# Patient Record
Sex: FEMALE | Race: ASIAN | NOT HISPANIC OR LATINO
[De-identification: names, ages, dates, MRNs, and addresses within clinical notes are randomized per-mention and may not be internally consistent; named-entity substitution may affect disease eponyms.]

---

## 2023-12-05 ENCOUNTER — RESULT REVIEW (OUTPATIENT)
Age: 28
End: 2023-12-05

## 2023-12-05 ENCOUNTER — APPOINTMENT (OUTPATIENT)
Dept: SURGICAL ONCOLOGY | Facility: CLINIC | Age: 28
End: 2023-12-05
Payer: COMMERCIAL

## 2023-12-05 PROCEDURE — 99205 OFFICE O/P NEW HI 60 MIN: CPT | Mod: 25

## 2023-12-05 PROCEDURE — 10021 FNA BX W/O IMG GDN 1ST LES: CPT | Mod: RT

## 2023-12-06 PROBLEM — Z00.00 ENCOUNTER FOR PREVENTIVE HEALTH EXAMINATION: Status: ACTIVE | Noted: 2023-12-06

## 2025-02-01 ENCOUNTER — TRANSCRIPTION ENCOUNTER (OUTPATIENT)
Age: 30
End: 2025-02-01

## 2025-02-01 ENCOUNTER — EMERGENCY (EMERGENCY)
Facility: HOSPITAL | Age: 30
LOS: 1 days | Discharge: HOME | End: 2025-02-01
Admitting: EMERGENCY MEDICINE
Payer: COMMERCIAL

## 2025-02-01 VITALS
HEIGHT: 63 IN | HEART RATE: 103 BPM | SYSTOLIC BLOOD PRESSURE: 139 MMHG | TEMPERATURE: 98 F | WEIGHT: 195.11 LBS | OXYGEN SATURATION: 100 % | RESPIRATION RATE: 16 BRPM | DIASTOLIC BLOOD PRESSURE: 83 MMHG

## 2025-02-01 VITALS
TEMPERATURE: 98 F | RESPIRATION RATE: 17 BRPM | OXYGEN SATURATION: 100 % | HEART RATE: 84 BPM | DIASTOLIC BLOOD PRESSURE: 63 MMHG | SYSTOLIC BLOOD PRESSURE: 119 MMHG

## 2025-02-01 LAB
ALBUMIN SERPL ELPH-MCNC: 4.2 G/DL — SIGNIFICANT CHANGE UP (ref 3.3–5)
ALP SERPL-CCNC: 48 U/L — SIGNIFICANT CHANGE UP (ref 40–120)
ALT FLD-CCNC: 18 U/L — SIGNIFICANT CHANGE UP (ref 4–33)
ANION GAP SERPL CALC-SCNC: 12 MMOL/L — SIGNIFICANT CHANGE UP (ref 7–14)
AST SERPL-CCNC: 14 U/L — SIGNIFICANT CHANGE UP (ref 4–32)
BASOPHILS # BLD AUTO: 0.04 K/UL — SIGNIFICANT CHANGE UP (ref 0–0.2)
BASOPHILS NFR BLD AUTO: 0.7 % — SIGNIFICANT CHANGE UP (ref 0–2)
BILIRUB SERPL-MCNC: 0.3 MG/DL — SIGNIFICANT CHANGE UP (ref 0.2–1.2)
BUN SERPL-MCNC: 7 MG/DL — SIGNIFICANT CHANGE UP (ref 7–23)
CALCIUM SERPL-MCNC: 9.4 MG/DL — SIGNIFICANT CHANGE UP (ref 8.4–10.5)
CHLORIDE SERPL-SCNC: 104 MMOL/L — SIGNIFICANT CHANGE UP (ref 98–107)
CO2 SERPL-SCNC: 23 MMOL/L — SIGNIFICANT CHANGE UP (ref 22–31)
CREAT SERPL-MCNC: 0.79 MG/DL — SIGNIFICANT CHANGE UP (ref 0.5–1.3)
EGFR: 104 ML/MIN/1.73M2 — SIGNIFICANT CHANGE UP
EOSINOPHIL # BLD AUTO: 0.12 K/UL — SIGNIFICANT CHANGE UP (ref 0–0.5)
EOSINOPHIL NFR BLD AUTO: 2 % — SIGNIFICANT CHANGE UP (ref 0–6)
GLUCOSE SERPL-MCNC: 86 MG/DL — SIGNIFICANT CHANGE UP (ref 70–99)
HCT VFR BLD CALC: 40.9 % — SIGNIFICANT CHANGE UP (ref 34.5–45)
HGB BLD-MCNC: 13 G/DL — SIGNIFICANT CHANGE UP (ref 11.5–15.5)
IANC: 3.87 K/UL — SIGNIFICANT CHANGE UP (ref 1.8–7.4)
IMM GRANULOCYTES NFR BLD AUTO: 0.2 % — SIGNIFICANT CHANGE UP (ref 0–0.9)
LYMPHOCYTES # BLD AUTO: 1.3 K/UL — SIGNIFICANT CHANGE UP (ref 1–3.3)
LYMPHOCYTES # BLD AUTO: 21.2 % — SIGNIFICANT CHANGE UP (ref 13–44)
MAGNESIUM SERPL-MCNC: 2.3 MG/DL — SIGNIFICANT CHANGE UP (ref 1.6–2.6)
MCHC RBC-ENTMCNC: 25.9 PG — LOW (ref 27–34)
MCHC RBC-ENTMCNC: 31.8 G/DL — LOW (ref 32–36)
MCV RBC AUTO: 81.5 FL — SIGNIFICANT CHANGE UP (ref 80–100)
MONOCYTES # BLD AUTO: 0.78 K/UL — SIGNIFICANT CHANGE UP (ref 0–0.9)
MONOCYTES NFR BLD AUTO: 12.7 % — SIGNIFICANT CHANGE UP (ref 2–14)
NEUTROPHILS # BLD AUTO: 3.87 K/UL — SIGNIFICANT CHANGE UP (ref 1.8–7.4)
NEUTROPHILS NFR BLD AUTO: 63.2 % — SIGNIFICANT CHANGE UP (ref 43–77)
NRBC # BLD AUTO: 0 K/UL — SIGNIFICANT CHANGE UP (ref 0–0)
NRBC # BLD: 0 /100 WBCS — SIGNIFICANT CHANGE UP (ref 0–0)
NRBC # FLD: 0 K/UL — SIGNIFICANT CHANGE UP (ref 0–0)
NRBC BLD-RTO: 0 /100 WBCS — SIGNIFICANT CHANGE UP (ref 0–0)
PLATELET # BLD AUTO: 321 K/UL — SIGNIFICANT CHANGE UP (ref 150–400)
POTASSIUM SERPL-MCNC: 3.9 MMOL/L — SIGNIFICANT CHANGE UP (ref 3.5–5.3)
POTASSIUM SERPL-SCNC: 3.9 MMOL/L — SIGNIFICANT CHANGE UP (ref 3.5–5.3)
PROT SERPL-MCNC: 7.8 G/DL — SIGNIFICANT CHANGE UP (ref 6–8.3)
RBC # BLD: 5.02 M/UL — SIGNIFICANT CHANGE UP (ref 3.8–5.2)
RBC # FLD: 14.3 % — SIGNIFICANT CHANGE UP (ref 10.3–14.5)
SODIUM SERPL-SCNC: 139 MMOL/L — SIGNIFICANT CHANGE UP (ref 135–145)
WBC # BLD: 6.12 K/UL — SIGNIFICANT CHANGE UP (ref 3.8–10.5)
WBC # FLD AUTO: 6.12 K/UL — SIGNIFICANT CHANGE UP (ref 3.8–10.5)

## 2025-02-01 PROCEDURE — 99284 EMERGENCY DEPT VISIT MOD MDM: CPT

## 2025-02-01 NOTE — ED PROVIDER NOTE - PATIENT PORTAL LINK FT
You can access the FollowMyHealth Patient Portal offered by [facility] by registering at the following website: [email or web address]. By joining [employer]’s FollowMyHealth portal, you will also be able to view your health information using other applications (apps) compatible with our system.

## 2025-02-01 NOTE — ED ADULT TRIAGE NOTE - CHIEF COMPLAINT QUOTE
Pt c/o multiple episodes of diarrhea, reports recently had covid two weeks ago. Endorsing diffuse lower abd pain. Denies n/v, urinary symptoms, fever, sob.

## 2025-02-01 NOTE — ED ADULT NURSE NOTE - OBJECTIVE STATEMENT
Patient received to wellness, a&ox4, ambulatory. c/o diarrhea, dysuria, and abdominal pain x 2weeks after COVID dx. pt denies chest pain, sob, n+v, headache, dizziness. RR even, unlabored. Pending SHAHNAZ israel.

## 2025-02-01 NOTE — ED PROVIDER NOTE - CLINICAL SUMMARY MEDICAL DECISION MAKING FREE TEXT BOX
Patient is a 29-year-old female with no pertinent past medical history presents with diarrhea x 2 weeks.  Patient reports 2 weeks ago she had nasal congestion and tested positive for COVID–19.  Patient reports she then developed "muddy" diarrhea.  Patient reports she has had diarrhea daily for the past 2 weeks, now becoming watery.  Patient reports 3-4 episodes of nonbloody diarrhea per day.  Patient also reports intermittent generalized lower abdominal cramping when she has urge to have a bowel movement.  Patient denies any fevers, chills, chest pain, shortness of breath, cough, nausea, vomiting, flank pain, back pain, dysuria, cloudy urine, vaginal discharge, recent antibiotic use, recent hospitalizations, recent travel history.  This is a patient with 2 weeks of watery nonbloody diarrhea.  Very low clinical suspicion for disease processes including but not limited to diverticulitis, colitis, appendicitis.  Plan to order labs, stool studies.  Patient reports she has appointment to see a gastroenterologist in 2 days.  Disposition pending workup.

## 2025-02-01 NOTE — ED PROVIDER NOTE - NSFOLLOWUPINSTRUCTIONS_ED_ALL_ED_FT
Advance activity as tolerated.  Continue all previously prescribed medications as directed unless otherwise instructed.      Follow up with your primary care physician in 48-72 hours- bring copies of your results.  Return to the Emergency Department for worsening or persistent symptoms, including but not limited to worsening/persistent pain, fevers, vomiting, chest pain, black or bloody stools, fevers, passing out, lightheadedness, dizziness, inability to pass bowel movements or gas, abdominal distension/swelling, and/or ANY NEW OR CONCERNING SYMPTOMS. If you have issues obtaining follow up, please call: [phone] to obtain a doctor or specialist who takes your insurance in your area.  You may call [phone] to make an appointment with the internal medicine clinic.    ABDOMINAL PAIN - General Information    Abdominal Pain    WHAT YOU NEED TO KNOW:    What do I need to know about abdominal pain? Abdominal pain may be felt anywhere between the bottom of your rib cage and your groin. Acute pain usually lasts less than 3 months. Chronic pain lasts longer than 3 months. Your pain may be sharp or dull. The pain may stay in the same place or move around. You may have the pain all the time, or it may come and go. Depending on the cause, you may also have nausea, vomiting, fever, or diarrhea.  Abdominal Organs    What causes abdominal pain? The cause may not be found. The following are common causes:    Overeating, gas pains, or food poisoning    Constipation or diarrhea    An injury    Appendicitis, a hernia, or an ulcer    Infection or a blockage    A liver, gallbladder, or kidney condition  How is the cause of abdominal pain diagnosed? Your healthcare provider will check your abdomen. He or she will ask where your pain is and when it started. Tell him or her if the pain wakes you or stops you from doing your daily activities. Describe anything that makes the pain better or worse. You may also need any of the following:    Blood, urine, or bowel movement samples may be tested for signs of an infection, disease, or injury.    X-ray pictures of your abdomen may show an injury or other cause of the pain.  How is abdominal pain treated?    Prescription pain medicine may be given. Ask your healthcare provider how to take this medicine safely. Some prescription pain medicines contain acetaminophen. Do not take other medicines that contain acetaminophen without talking to your healthcare provider. Too much acetaminophen may cause liver damage. Prescription pain medicine may cause constipation. Ask your healthcare provider how to prevent or treat constipation.    Medicines may be given to calm your stomach or prevent vomiting.    Relaxation therapy may be used along with pain medicine.    Surgery may be needed, depending on the cause.  What can I do to manage or prevent abdominal pain?    Apply heat on your abdomen for 20 to 30 minutes every 2 hours for as many days as directed. Heat helps decrease pain and muscle spasms.    Make changes to the foods you eat, if needed. Do not eat foods that cause abdominal pain or other symptoms. Eat small meals more often. The following changes may also help:  Eat more high-fiber foods if you are constipated. High-fiber foods include fruits, vegetables, whole-grain foods, and legumes such as mcnair beans.        Do not eat foods that cause gas if you have bloating. Examples include broccoli, cabbage, beans, and carbonated drinks.    Do not eat foods or drinks that contain sorbitol or fructose if you have diarrhea and bloating. Some examples are fruit juices, candy, jelly, and sugar-free gum.    Do not eat high-fat foods. Examples include fried foods, cheeseburgers, hot dogs, and desserts.    Make changes to the liquids you drink, if needed. Do not drink liquids that cause pain or make it worse, such as orange juice. Drink liquids throughout the day to stay hydrated. The following changes may also help:  Drink more liquids to prevent dehydration from diarrhea or vomiting. Ask your healthcare provider how much liquid to drink each day and which liquids are best for you.    Limit or do not have caffeine. Caffeine may make symptoms such as heartburn or nausea worse.    Limit or do not drink alcohol. Alcohol can make your abdominal pain worse. Ask your healthcare provider if it is okay for you to drink alcohol. Also ask how much is okay for you to drink. A drink of alcohol is 12 ounces of beer, ½ ounce of liquor, or 5 ounces of wine.    Keep a diary of your abdominal pain. A diary may help your healthcare provider learn what is causing your pain. Include when the pain happens, how long it lasts, and what the pain feels like. Write down any other symptoms you have with abdominal pain. Also write down what you eat, and any symptoms you have after you eat.    Manage stress. Stress may cause abdominal pain. Your healthcare provider may recommend relaxation techniques and deep breathing exercises to help decrease your stress. Your healthcare provider may recommend you talk to someone about your stress or anxiety, such as a counselor or a friend. Get plenty of sleep. Exercise regularly.  [employer] Walking for Exercise      Do not smoke. Nicotine and other chemicals in cigarettes can damage your esophagus and stomach. Ask your healthcare provider for information if you currently smoke and need help to quit. E-cigarettes or smokeless tobacco still contain nicotine. Talk to your healthcare provider before you use these products.  Call your local emergency number (911 in the [place]) if:    You have chest pain or shortness of breath.    When should I seek immediate care?    You have pulsing pain in your upper abdomen or lower back that suddenly becomes constant.    Your pain is in the right lower abdominal area and worsens with movement.    You have a fever over 100.4°F (38°C) or shaking chills.    You are vomiting and cannot keep food or liquids down.    Your pain does not improve or gets worse over the next 8 to 12 hours.    You see blood in your vomit or bowel movements, or they look black and tarry.    Your skin or the whites of your eyes turn yellow.    You are a woman and have a large amount of vaginal bleeding that is not your monthly period.  When should I call my doctor?    You have pain in your lower back.    You are a man and have pain in your testicles.    You have pain when you urinate.    You have questions or concerns about your condition or care.  CARE AGREEMENT:    You have the right to help plan your care. Learn about your health condition and how it may be treated. Discuss treatment options with your healthcare providers to decide what care you want to receive. You always have the right to refuse treatment.    © [employer] 1973, 2023    ACUTE DIARRHEA - General Information    Acute Diarrhea    WHAT YOU NEED TO KNOW:    What do I need to know about acute diarrhea?Acute diarrhea starts quickly and lasts a short time, usually 1 to 3 days. It can last up to 2 weeks.    What causes acute diarrhea?    Bacteria, such as E coli or salmonella    Viruses, such as rotavirus and norovirus    A parasite, such as giardia    Medicines, such as laxatives, antacids, or antibiotics    An allergy to lactose, soy, or gluten    Eating food or drinking water that contains germs    Medical treatments, such as chemotherapy or radiation  What other signs and symptoms might I have with acute diarrhea? You may have 3 or more episodes of diarrhea. It may be hard to control your diarrhea. You may also have any of the following:    Fever and chills    Headache or abdominal pain    Nausea and vomiting    Symptoms of dehydration such as thirst, decreased urination, dry skin, sunken eyes, or fast, pounding heartbeat  What does my healthcare provider need to know about my acute diarrhea? Your healthcare provider will ask about your symptoms. He or she will ask what you have recently eaten and if you have traveled to other countries. Tell the provider what medicines you use or if you have been around anyone who is sick. Your healthcare provider may check you for signs of dehydration. He or she may also test your blood and bowel movement for infection.    How is acute diarrhea treated? Acute diarrhea usually gets better without treatment. You may need any of the following if your diarrhea is severe or lasts longer than a few days:    Diarrhea medicine is an over-the-counter medicine that helps slow or stop your diarrhea. Do not take this medicine unless your healthcare provider says it is okay.    Antibiotics may be given to help treat an infection caused by bacteria.    Antiparasitics may be given to treat an infection caused by parasites.  How can acute diarrhea be managed?    Drink liquids as directed. Liquids will help prevent dehydration caused by diarrhea. Ask your healthcare provider how much liquid to drink each day and which liquids are best for you. You may need to drink an oral rehydration solution (ORS). An ORS has the right amounts of water, salts, and sugar you need to replace body fluids. You can buy an ORS at most grocery stores and pharmacies.    Eat foods that are easy to digest. Examples include rice, lentils, cereal, bananas, potatoes, and bread. It also includes some fruits (bananas, melon), well-cooked vegetables, and lean meats. Do not eat foods high in fiber, fat, or sugar. Also, do not drink alcohol until your diarrhea is gone.  How can acute diarrhea be prevented?    Wash your hands often. Use soap and water. Wash your hands before you eat or prepare food. Also wash your hands after you use the bathroom. Use an alcohol-based hand gel when soap and water are not available.  Handwashing      Keep bathroom surfaces clean. This helps prevent the spread of germs that cause acute diarrhea.    Wash fruits and vegetables well before you eat them. This can help remove germs that cause diarrhea. If possible, remove the skin from fruits and vegetables, or cook them well before you eat them.    Cook meat and poultry as directed. Meat includes beef and pork. Poultry includes chicken, turkey, and duck.  Cook ground meat to 160°F.    Cook ground poultry, whole poultry, or cuts of poultry to at least 165°F. Remove the poultry from heat. Let it stand for 3 minutes before you eat it.    Cook whole cuts of meat other than poultry to at least 145°F. Remove the meat from heat. Let it stand for 3 minutes before you eat it.    Wash dishes that have touched raw meat or poultry with hot water and soap. This includes cutting boards, utensils, dishes, and serving containers.    Place raw or cooked meat in the refrigerator as soon as possible. Bacteria can grow in meat that is left at room temperature too long.    Do not eat raw or undercooked oysters, clams, or mussels. These foods may be contaminated and cause infection.    Drink only filtered or treated water when you travel. Do not put ice in your drinks. Drink bottled water whenever possible.  When should I seek immediate care?    You feel confused.    Your heartbeat is faster than usual.    Your eyes look deeply sunken, or you have no tears when you cry.    You urinate less than usual, or your urine is dark yellow.    You have blood or mucus in your bowel movements.    You have severe abdominal pain.    You are unable to drink any liquids.  When should I contact my healthcare provider?    Your symptoms do not get better with treatment.    You have a fever higher than 101.3°F (38.5°C).    You have trouble eating and drinking because you are vomiting.    Your diarrhea does not get better in 7 days.    You have questions or concerns about your condition or care.  CARE AGREEMENT:    You have the right to help plan your care. Learn about your health condition and how it may be treated. Discuss treatment options with your healthcare providers to decide what care you want to receive. You always have the right to refuse treatment.    © [employer] 1973, 2023

## 2025-02-01 NOTE — ED PROVIDER NOTE - PROGRESS NOTE DETAILS
SHAHNAZ PORTILLO:  Pt denies any active pain.  Pt without episodes of diarrhea during ED stay.  Pt tolerating PO.  Lab results nonactionable.  Pt medically stable for discharge.  Strict return precautions given.  Pt to follow up with PMD and with GI at her upcoming appointment.

## 2025-02-01 NOTE — ED PROVIDER NOTE - OBJECTIVE STATEMENT
Patient is a 29-year-old female with no pertinent past medical history presents with diarrhea x 2 weeks.  Patient reports 2 weeks ago she had nasal congestion and tested positive for COVID–19.  Patient reports she then developed "muddy" diarrhea.  Patient reports she has had diarrhea daily for the past 2 weeks, now becoming watery.  Patient reports 3-4 episodes of nonbloody diarrhea per day.  Patient also reports intermittent generalized lower abdominal cramping when she has urge to have a bowel movement.  Patient denies any fevers, chills, chest pain, shortness of breath, cough, nausea, vomiting, flank pain, back pain, dysuria, cloudy urine, vaginal discharge, recent antibiotic use, recent hospitalizations, recent travel history.

## 2025-02-02 LAB
HCV AB S/CO SERPL IA: 0.08 S/CO — SIGNIFICANT CHANGE UP (ref 0–0.99)
HCV AB SERPL-IMP: SIGNIFICANT CHANGE UP